# Patient Record
Sex: FEMALE | Race: OTHER
[De-identification: names, ages, dates, MRNs, and addresses within clinical notes are randomized per-mention and may not be internally consistent; named-entity substitution may affect disease eponyms.]

---

## 2019-04-02 ENCOUNTER — HOSPITAL ENCOUNTER (EMERGENCY)
Dept: HOSPITAL 74 - JER | Age: 79
Discharge: HOME | End: 2019-04-02
Payer: COMMERCIAL

## 2019-04-02 VITALS — DIASTOLIC BLOOD PRESSURE: 52 MMHG | TEMPERATURE: 98 F | HEART RATE: 70 BPM | SYSTOLIC BLOOD PRESSURE: 122 MMHG

## 2019-04-02 VITALS — BODY MASS INDEX: 28.7 KG/M2

## 2019-04-02 DIAGNOSIS — Z79.84: ICD-10-CM

## 2019-04-02 DIAGNOSIS — E11.9: ICD-10-CM

## 2019-04-02 DIAGNOSIS — Z87.891: ICD-10-CM

## 2019-04-02 DIAGNOSIS — K11.20: Primary | ICD-10-CM

## 2019-04-02 DIAGNOSIS — I25.10: ICD-10-CM

## 2019-04-02 DIAGNOSIS — I69.854: ICD-10-CM

## 2019-04-02 DIAGNOSIS — I10: ICD-10-CM

## 2019-04-02 DIAGNOSIS — E78.00: ICD-10-CM

## 2019-04-02 LAB
ALBUMIN SERPL-MCNC: 3.4 G/DL (ref 3.4–5)
ALP SERPL-CCNC: 71 U/L (ref 45–117)
ALT SERPL-CCNC: 17 U/L (ref 13–61)
ANION GAP SERPL CALC-SCNC: 15 MMOL/L (ref 8–16)
AST SERPL-CCNC: 21 U/L (ref 15–37)
BASOPHILS # BLD: 0.7 % (ref 0–2)
BILIRUB SERPL-MCNC: 1.2 MG/DL (ref 0.2–1)
BUN SERPL-MCNC: 25 MG/DL (ref 7–18)
CALCIUM SERPL-MCNC: 8.3 MG/DL (ref 8.5–10.1)
CHLORIDE SERPL-SCNC: 104 MMOL/L (ref 98–107)
CO2 SERPL-SCNC: 23 MMOL/L (ref 21–32)
CREAT SERPL-MCNC: 1.1 MG/DL (ref 0.55–1.3)
DEPRECATED RDW RBC AUTO: 15.4 % (ref 11.6–15.6)
EOSINOPHIL # BLD: 7.1 % (ref 0–4.5)
GLUCOSE SERPL-MCNC: 87 MG/DL (ref 74–106)
HCT VFR BLD CALC: 35.7 % (ref 32.4–45.2)
HGB BLD-MCNC: 11.7 GM/DL (ref 10.7–15.3)
LYMPHOCYTES # BLD: 13.5 % (ref 8–40)
MCH RBC QN AUTO: 30.4 PG (ref 25.7–33.7)
MCHC RBC AUTO-ENTMCNC: 32.7 G/DL (ref 32–36)
MCV RBC: 92.8 FL (ref 80–96)
MONOCYTES # BLD AUTO: 6.7 % (ref 3.8–10.2)
NEUTROPHILS # BLD: 72 % (ref 42.8–82.8)
PLATELET # BLD AUTO: 196 K/MM3 (ref 134–434)
PMV BLD: 9.7 FL (ref 7.5–11.1)
POTASSIUM SERPLBLD-SCNC: 4.9 MMOL/L (ref 3.5–5.1)
PROT SERPL-MCNC: 7.4 G/DL (ref 6.4–8.2)
RBC # BLD AUTO: 3.84 M/MM3 (ref 3.6–5.2)
SODIUM SERPL-SCNC: 142 MMOL/L (ref 136–145)
WBC # BLD AUTO: 10.1 K/MM3 (ref 4–10)

## 2019-04-02 PROCEDURE — 3E0333Z INTRODUCTION OF ANTI-INFLAMMATORY INTO PERIPHERAL VEIN, PERCUTANEOUS APPROACH: ICD-10-PCS

## 2019-04-02 NOTE — PDOC
History of Present Illness





<Denise Dodd - Last Filed: 04/02/19 15:22>





- General


History Source: Patient, Family





- History of Present Illness


Associated Symptoms: denies: cough, fever/chills, nausea/vomiting, weakness





<Kinza BiggsShirley - Last Filed: 04/02/19 16:49>





- General


Chief Complaint: Wound


Stated Complaint: LT FACE SWOLLEN


Time Seen by Provider: 04/02/19 13:22





Past History





<Denise Doddjordynmarkell - Last Filed: 04/02/19 15:22>





- Past Medical History


COPD: No


Diabetes: Yes


HTN: Yes


Hypercholesterolemia: Yes





- Suicide/Smoking/Psychosocial Hx


Smoking History: Unknown if ever smoked





<KalyanKinzaDouglas - Last Filed: 04/02/19 16:49>





- Past Medical History


Allergies/Adverse Reactions: 


 Allergies











Allergy/AdvReac Type Severity Reaction Status Date / Time


 


No Known Allergies Allergy   Verified 04/02/19 12:46











Home Medications: 


Ambulatory Orders





Acetaminophen W/ Codeine #3 [Tylenol # 3 -] 1 tab PO Q4H #12 tablet MDD 6 doses 

04/02/19 


Amlodipine Besylate [Norvasc -] 5 mg PO DAILY 04/02/19 


Clopidogrel Bisulfate [Plavix -] 75 mg PO DAILY 04/02/19 


Enalapril Maleate [Vasotec] 20 mg PO DAILY 04/02/19 


Furosemide [Lasix -] 40 mg PO DAILY 04/02/19 


Isosorbide Mononitrate [Ismo -] 20 mg PO BID@1000,1700 04/02/19 


Metformin HCl [Glucophage] 500 mg PO DAILY 04/02/19 


Simvastatin [Zocor -] 40 mg PO HS 04/02/19 











**Review of Systems





- Review of Systems


Constitutional: No: Chills, Fever, Malaise, Unexplained wgt Loss


Respiratory: No: Cough, Shortness of Breath


Cardiac (ROS): No: Chest Pain





<KalyanKinzaDouglas - Last Filed: 04/02/19 16:49>





*Physical Exam





- Vital Signs


 Last Vital Signs











Temp Pulse Resp BP Pulse Ox


 


 99.2 F   96 H  17   122/51 L  94 L


 


 04/02/19 12:40  04/02/19 12:40  04/02/19 12:40  04/02/19 12:40  04/02/19 12:40














<DoddDenise Megha - Last Filed: 04/02/19 15:22>





- Vital Signs


 Last Vital Signs











Temp Pulse Resp BP Pulse Ox


 


 99.2 F   96 H  17   122/51 L  94 L


 


 04/02/19 12:40  04/02/19 12:40  04/02/19 12:40  04/02/19 12:40  04/02/19 12:40














- Physical Exam


General Appearance: Yes: Appropriately Dressed, Mild Distress


HEENT: positive: Normal Voice, Other


Neck: positive: Supple


Respiratory/Chest: positive: Lungs Clear, Normal Breath Sounds.  negative: 

Respiratory Distress


Cardiovascular: positive: Regular Rate, S1, S2


Gastrointestinal/Abdominal: positive: Soft.  negative: Tender


Integumentary: positive: Dry, Warm


Neurologic: positive: Fully Oriented, Alert, Normal Mood/Affect





<Mark Biggs - Last Filed: 04/02/19 16:49>





ED Treatment Course





- LABORATORY


CBC & Chemistry Diagram: 


 04/02/19 14:27





 04/02/19 14:27





- ADDITIONAL ORDERS


Additional order review: 


 Laboratory  Results











  04/02/19





  14:27


 


Sodium  142


 


Potassium  4.9


 


Chloride  104


 


Carbon Dioxide  23


 


Anion Gap  15


 


BUN  25 H


 


Creatinine  1.1


 


Creat Clearance w eGFR  47.67


 


Random Glucose  87


 


Calcium  8.3 L


 


Total Bilirubin  1.2 H


 


AST  21


 


ALT  17


 


Alkaline Phosphatase  71


 


Total Protein  7.4


 


Albumin  3.4








 











  04/02/19





  14:27


 


RBC  3.84


 


MCV  92.8


 


MCHC  32.7


 


RDW  15.4


 


MPV  9.7


 


Neutrophils %  72.0


 


Lymphocytes %  13.5


 


Monocytes %  6.7


 


Eosinophils %  7.1 H


 


Basophils %  0.7














- Medications


Given in the ED: 


ED Medications














Discontinued Medications














Generic Name Dose Route Start Last Admin





  Trade Name Freq  PRN Reason Stop Dose Admin


 


Ketorolac Tromethamine  30 mg  04/02/19 13:49  04/02/19 14:40





  Toradol Injection -  IVPUSH  04/02/19 13:50  30 mg





  ONCE ONE   Administration





     





     





     





     














<Denise Dodd - Last Filed: 04/02/19 15:22>





- LABORATORY


CBC & Chemistry Diagram: 


 04/02/19 14:27





 04/02/19 14:27





- RADIOLOGY


Radiology Studies Ordered: 














 Category Date Time Status


 


 SOFT TISSUE NECK CT W/O CONTR [CT] Stat CT Scan  04/02/19 13:47 Ordered














<Mark Biggs - Last Filed: 04/02/19 16:49>





Medical Decision Making





- Medical Decision Making


The patient was seen and evaluated in conjunction with midlevel provider under 

my direct supervision, ancillary studies were reviewed.  I agree with the plan 

as outlined by JANIE Biggs. HPI, workup/dispo as outlined. VS reviewed, wnl.





04/02/19 15:22








<JuDenise Arnolmarkell - Last Filed: 04/02/19 15:22>





- Medical Decision Making





04/02/19 13:51


79-year-old female, fomer smoker (>40 ppd, quit many years ago),  HTN, CAD, s/p 

CVA with L-sided deficit, brought in by family for painful L facial swelling 

that started yesterday.  No dental pain, ear pain, sore throat, HA, body aches, 

fatigue, malaise, f/c. Seen by Dr Jackson yesterday and had neg labs and rapid 

strep test per family member and told to come to ED for CT scan. Pt reports 

that she had similar facial swelling but b/l about 1 month ago while still 

residing in the . States she was seen by her doctor then but dx was unclear. 

States symptoms resolved on its own. No unexplained weight loss.





See exam





L facial swelling


Parotitis vs salivary gland stone vs abscess vs lymphadenopathy


-pain control


-labs


-CT











04/02/19 16:29


CT read as no definite fluid collection or e/o ductal stone.  There is 

thickening of the L platysma muscle with some associated edema.  Also seen is 

mild prominence of the L parotid gland consistent with acute vs chronic 

parotitis.  Labs unremarkable. Case was discussed with Phuc Bailey, referring 

PMD, who confirms he saw patient in clinic yesterday. States labs/strep were 

normal and started pt on augmentin. States he gave patient referral form to go 

directly to radiology for CAT scan and states he did not instruct patient to 

come to the ED.  CT findings discussed with M.D. who agrees with discharging 

with supportive treatment and states patient can follow-up in office.  This was 

discussed with patient and family.  Reasons to return to ED discussed 














<Mark Biggs - Last Filed: 04/02/19 16:49>





*DC/Admit/Observation/Transfer





<Denise Dodd - Last Filed: 04/02/19 15:22>





<Mark Biggs - Last Filed: 04/02/19 16:49>


Diagnosis at time of Disposition: 


 Left facial swelling, Parotitis








- Discharge Dispostion


Disposition: HOME


Condition at time of disposition: Improved





- Prescriptions


Prescriptions: 


Acetaminophen W/ Codeine #3 [Tylenol # 3 -] 1 tab PO Q4H #12 tablet MDD 6 doses





- Referrals


Referrals: 


Phuc Jackson MD [Primary Care Provider] - 





- Patient Instructions


Printed Discharge Instructions:  Parotitis


Additional Instructions: 





Sosa escner de sandi mostr algo de inflamacin en la glndula tad izquierda

, que es oxana glndula salival que produce saliva.


[place] generalmente es causado por un virus, yvrose sosa mdico le recet un 

antibitico que debe edison segn las indicaciones.


Swapnil debe edison medicamentos para el dolor segn las indicaciones.


Es importante que ady muchos lquidos, realice grgaras de agua salada y chupe 

dulces azucarados o con sabor a limn, freeman aj se analiza luis [place] aplique compresas tibias en el alana varias veces al da arabella 10-15 

minutos cada vez.


Por favor linda un seguimiento con sosa mdico la prxima semana.


Volver a la [place] para el empeoramiento de los sntomas.


Print Language: [place]i





- Post Discharge Activity